# Patient Record
Sex: FEMALE | Race: WHITE
[De-identification: names, ages, dates, MRNs, and addresses within clinical notes are randomized per-mention and may not be internally consistent; named-entity substitution may affect disease eponyms.]

---

## 2017-04-01 ENCOUNTER — HOSPITAL ENCOUNTER (EMERGENCY)
Dept: HOSPITAL 41 - JD.ED | Age: 47
Discharge: HOME | End: 2017-04-01
Payer: COMMERCIAL

## 2017-04-01 VITALS — DIASTOLIC BLOOD PRESSURE: 72 MMHG | SYSTOLIC BLOOD PRESSURE: 111 MMHG

## 2017-04-01 DIAGNOSIS — G43.909: Primary | ICD-10-CM

## 2017-04-01 DIAGNOSIS — Z79.899: ICD-10-CM

## 2017-04-01 DIAGNOSIS — Z88.5: ICD-10-CM

## 2017-04-01 DIAGNOSIS — Z91.040: ICD-10-CM

## 2017-04-01 PROCEDURE — 70450 CT HEAD/BRAIN W/O DYE: CPT

## 2017-04-01 PROCEDURE — 96374 THER/PROPH/DIAG INJ IV PUSH: CPT

## 2017-04-01 PROCEDURE — 96361 HYDRATE IV INFUSION ADD-ON: CPT

## 2017-04-01 PROCEDURE — 96372 THER/PROPH/DIAG INJ SC/IM: CPT

## 2017-04-01 PROCEDURE — 96375 TX/PRO/DX INJ NEW DRUG ADDON: CPT

## 2017-04-01 PROCEDURE — 99284 EMERGENCY DEPT VISIT MOD MDM: CPT

## 2017-04-01 NOTE — CT
Addendum: Impression states areas of encephalomalacia on the right 

side as noted above.  Areas of encephalomalacia are on the left side. 

 Other portions of the dictation remain the same.

 

--- Addendum1 above dictated on [04/17/2017 11:06] by [BRANDO Mac, 

Keshawn SHRESTHA] ---

--- Addendum1 above signed on [04/17/2017 11:08] by [BRANDO Mac Hilton J.] ---

 

--- Original report below dictated on [04/01/2017 15:26] by [BRANDO Mac Hilton J.] ---

--- Original report below signed on [04/01/2017 15:26] by [BRANDO Mac Hilton J.] ---

 

Head CT

 

Technique: Multiple axial sections through the brain were obtained.  

Intravenous contrast was not utilized.

 

Comparison: No previous intracranial imaging.

 

Findings: Low density area is identified within the left posterior 

temporal and anterior occipital region compatible with an area of 

encephalomalacia from old injury.  Slight deformity in the adjacent 

cranium is seen compatible with old skull fracture.  No other abnormal

 parenchymal densities are seen.  No evidence of intracranial 

hemorrhage.  No midline shift or mass effect is seen.

 

Bone window settings were reviewed which shows mild mucosal thickening

 within portions of the sphenoid sinus and ethmoid sinuses.  No 

air-fluid levels are seen.  Soft tissue density is noted within the 

right mastoid sinus most likely chronic.  No acute calvarial 

abnormality is seen.

 

Impression:

1.  Mucosal thickening within the ethmoid and maxillary sinuses most 

likely representing chronic sinusitis.

2.  Soft tissue density within the right mastoid sinus most likely 

chronic.

3.  Area of encephalomalacia on the right side as noted above.  

Adjacent old appearing skull fracture noted.

4.  No acute intracranial abnormality is identified.

 

Diagnostic code #3

--- Addendum1 signed ---
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
## 2018-03-23 ENCOUNTER — HOSPITAL ENCOUNTER (OUTPATIENT)
Dept: HOSPITAL 41 - JD.SDS | Age: 48
Discharge: HOME | End: 2018-03-23
Attending: OBSTETRICS & GYNECOLOGY
Payer: COMMERCIAL

## 2018-03-23 VITALS — DIASTOLIC BLOOD PRESSURE: 81 MMHG | SYSTOLIC BLOOD PRESSURE: 122 MMHG

## 2018-03-23 DIAGNOSIS — K59.09: ICD-10-CM

## 2018-03-23 DIAGNOSIS — Z91.040: ICD-10-CM

## 2018-03-23 DIAGNOSIS — Z88.8: ICD-10-CM

## 2018-03-23 DIAGNOSIS — N70.01: ICD-10-CM

## 2018-03-23 DIAGNOSIS — F41.9: ICD-10-CM

## 2018-03-23 DIAGNOSIS — N70.11: ICD-10-CM

## 2018-03-23 DIAGNOSIS — H91.92: ICD-10-CM

## 2018-03-23 DIAGNOSIS — Z88.5: ICD-10-CM

## 2018-03-23 DIAGNOSIS — G43.909: ICD-10-CM

## 2018-03-23 DIAGNOSIS — D25.9: Primary | ICD-10-CM

## 2018-03-23 DIAGNOSIS — Z79.899: ICD-10-CM

## 2018-03-23 DIAGNOSIS — N80.0: ICD-10-CM

## 2018-03-23 DIAGNOSIS — N63.0: ICD-10-CM

## 2018-03-23 DIAGNOSIS — J30.2: ICD-10-CM

## 2018-03-23 PROCEDURE — 86901 BLOOD TYPING SEROLOGIC RH(D): CPT

## 2018-03-23 PROCEDURE — 81003 URINALYSIS AUTO W/O SCOPE: CPT

## 2018-03-23 PROCEDURE — 85025 COMPLETE CBC W/AUTO DIFF WBC: CPT

## 2018-03-23 PROCEDURE — 84703 CHORIONIC GONADOTROPIN ASSAY: CPT

## 2018-03-23 PROCEDURE — 86900 BLOOD TYPING SEROLOGIC ABO: CPT

## 2018-03-23 PROCEDURE — 82565 ASSAY OF CREATININE: CPT

## 2018-03-23 PROCEDURE — 58262 VAG HYST INCLUDING T/O: CPT

## 2018-03-23 PROCEDURE — 86850 RBC ANTIBODY SCREEN: CPT

## 2018-03-23 PROCEDURE — 36415 COLL VENOUS BLD VENIPUNCTURE: CPT

## 2018-03-23 RX ADMIN — LIDOCAINE HYDROCHLORIDE AND EPINEPHRINE ONE ML: 10; 10 INJECTION, SOLUTION INFILTRATION; PERINEURAL at 09:45

## 2018-03-23 RX ADMIN — SODIUM CHLORIDE ONE ML: 9 INJECTION, SOLUTION INTRAMUSCULAR; INTRAVENOUS; SUBCUTANEOUS at 10:56

## 2018-03-23 RX ADMIN — SODIUM CHLORIDE ONE ML: 9 INJECTION, SOLUTION INTRAMUSCULAR; INTRAVENOUS; SUBCUTANEOUS at 10:43

## 2018-03-23 RX ADMIN — LIDOCAINE HYDROCHLORIDE AND EPINEPHRINE ONE ML: 10; 10 INJECTION, SOLUTION INFILTRATION; PERINEURAL at 10:43
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
## 2018-03-23 NOTE — PCM.OPNOTE
- General Post-Op/Procedure Note


Date of Surgery/Procedure: 03/23/18


Operative Procedure(s): Total vaginal hysterectomy with bilateral salpingectomy


Findings: 





Uterus upper limits normal size. Ovaries were normal and functional in nature. 

Fallopian tubes were benign in appearance. Mild cystocele and rectocele noted.


Pre Op Diagnosis: Menorrhagia, irregular uterine bleeding


Post-Op Diagnosis: Same


Anesthesia Technique: General ET Tube


Other Anesthesia Type: Lidocaine quarter percent with [id number] mL


Primary Surgeon: Mario Padron


Secondary Surgeon: Tomas Coyne


Anesthesia Provider: Alban Mayorga


Assistant: Saji Pressley


Pathology: 





Uterus and bilateral fallopian tubes in one container


Fluid Replacement, Intraop: 1,700


EBL in mLs: 50


Complications: None


Condition: Good


Free Text/Narrative:: 





Surgery duration: 29 minutes








Procedure:   The patient was placed in supine position on the operating table. 

General endotracheal anesthesia was accomplished. After positioning, and 

adequate prep and drape, the procedure was then performed. Sterile speculum was 

placed in the vagina and cervix was visualized. Cervix was injected with 

lidocaine quarter percent with [id number] mL used. A full circumference 

incision was made in the cervical epithelium. The bladder was pushed well back 

off cervix. Posterior cul-de-sac was then entered sharply without problems. 

Left uterosacral was crossclamped with a Enseal vessel closure system. The left 

uterosacral and then the right uterosacral ligament pedicles were developed 

using the Enseal system. The anterior cul-de-sac was then entered without 

problems and the uterine vasculature, cardinal ligament and broad ligament then 

developed using Enseal vessel closure system. The uterus was inverted at this 

time and upper broad ligament fallopian tube pedicles were crossclamped with 

Mohan clamps. Specimen was totally removed. Both these pedicles were then 

secured with a Mohan stitch of #1 Vicryl.





Left and right fallopian tube was normal in appearance.. Using Enseal vessel 

closure system each of the tubes was then removed and sent with the specimen.  

  The patient was found to be hemostatically intact at this time.  Vaginal cuff 

was sutured for hemostatic reasons with a running locked suture of 0 Monocryl 

from the 2 o'clock position to the 10 o'clock position posteriorly.  Modified 

Moschcowitz procedure was performed with a n 0 Monocryl suture placed through 

the posterior aspect of the vaginal cuff over to the patient's right 

uterosacral ligament then the Small bites of posterior cul-de-sac peritoneum to 

the left uterosacral ligament and incorporating that and vaginal angle into 

this the suture and then back to midline and out the posterior vaginal cuff 

area. The suture was tied as the last action of this surgery. Vaginal cuff was 

then closed from right to left side with a running locked suture of 0 Monocryl.

  Patient was returned to supine position and awakened from general 

endotracheal anesthesia. She tolerated the procedure was then left the 

operating room in satisfactory condition.

## 2018-03-23 NOTE — [id number]
Post Anesthesia Note





- EVALUATION WITHIN 48HRS OF ANESTHETIC


Vital Signs in Normal Range: Yes


Patient Participated in Evaluation: Yes


Respiratory Function Stable: Yes


Airway Patent: Yes


Cardiovascular Function Stable: Yes


Hydration Status Stable: Yes


Pain Control Satisfactory: Yes


Nausea and Vomiting Control Satisfactory: Yes


Mental Status Recovered: Yes


Pulse Rate: 107


SaO2: 99


Resp Rate: 8


Temperature: 37.1 C


Blood Pressure: 116/63





- COMMENTS/OBSERVATIONS


Free Text/Narrative:: 





no anesthesia complications noted

## 2021-02-18 NOTE — CR
Left knee: AP and lateral views left knee were obtained.

 

Comparison: Prior CT left knee study of 01/28/21.

 

Findings: Left knee prosthesis is noted as well as a patellar 

prosthesis.  Components are aligned.  Soft tissue air is noted from 

the surgical procedure.  No acute underlying bony abnormality is 

otherwise seen.

 

Impression:

1.  Satisfactory postop radiographic appearance of recently placed 

left knee prosthesis.

 

Diagnostic code #2

## 2021-02-18 NOTE — [id number]
Post Anesthesia Note





- EVALUATION WITHIN 48HRS OF ANESTHETIC


Vital Signs in Normal Range: Yes


Patient Participated in Evaluation: Yes


Respiratory Function Stable: Yes


Airway Patent: Yes


Cardiovascular Function Stable: Yes


Hydration Status Stable: Yes


Pain Control Satisfactory: Yes


Nausea and Vomiting Control Satisfactory: Yes


Mental Status Recovered: Yes


Vital Signs: 


                                Last Vital Signs











Temp  97.9 F   02/18/21 12:00


 


Pulse  73   02/18/21 12:35


 


Resp  16   02/18/21 12:35


 


BP  111/71   02/18/21 12:35


 


Pulse Ox  99   02/18/21 12:35














- COMMENTS/OBSERVATIONS


Free Text/Narrative:: 


Preparing for discharge home

## 2021-02-18 NOTE — PCM.PRNOTE
- Free Text/Narrative


Note: 


Postoperative regional pain control requested by surgeon.


Pre-op Dx: Lt knee osteoarthritis.


Post-op Rx: Total Lt knee arthroplasty. 


Procedure: Lt Adductor canal block with U/S guidance


Requesting physician: Dr. Nilo Estrada





Risks and benefits discussed with the patient preoperatively including 

infection, bleeding, incomplete or failed block, possible nerve damage, local 

anesthetic toxicity. Permit signed.


Patient after spinal anesthesia post surgery in PACU, stable , alert and awake. 

Time out performed. Left mid-thigh was prepped with Chloraprep x 1 and allowed 

to dry. Under aseptic technique, the left femoral artery and sartorius muscle 

were identified under ultrasound prior to needle insertion. 4" Stimuplex needle 

#22 G was inserted under US guidance. Under direct visualization of needle tip 

the injection of 0.5% Ropivacaine with 1:200k epinephrine, with 8 mg of 

Dexamethasone and 40 mcg of Dexmedetomidine, total of 25 mls in divided doses, 

maintaining negative aspiration was completed without problems. No local 

anesthetic toxicity was noted. Patient is awake, stable and tolerated the 

procedure well.


Time: 10:05 - 10:08


Please see attached U/S pictures.

## 2021-02-18 NOTE — PCM.POSTAN
POST ANESTHESIA ASSESSMENT





- MENTAL STATUS


Mental Status: Alert, Oriented





- VITAL SIGNS


Vital Signs: 


Postoperative VSs





122/65


105


22 RR


99%


97.2F





- RESPIRATORY


Respiratory Status: Respiratory Rate WNL, Airway Patent, O2 Saturation Stable





- CARDIOVASCULAR


CV Status: Pulse Rate WNL, Blood Pressure Stable





- GASTROINTESTINAL


GI Status: No Symptoms





- PAIN


Pain Score: 3 (ACB block is being done)





- POST OP HYDRATION


Hydration Status: Adequate & Stable
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
## 2021-03-01 NOTE — PCM.OPNOTE
- General Post-Op/Procedure Note


Date of Surgery/Procedure: 02/18/21


Operative Procedure(s): left total knee arthroplasty with andra robotics


Pre Op Diagnosis: left knee osteoarthrosis


Post-Op Diagnosis: Same


Anesthesia Technique: Local, MAC, Spinal


Primary Surgeon: Nilo Suarez


Anesthesia Provider: Nicholas Patino


Assistant: Deborah Moreau


Assistant: Patricia Kelly


EBL in mLs: 100


Complications: None


Condition: Good


Free Text/Narrative:: 


3 femur


4itibia


9mm


32x10

## 2021-03-01 NOTE — OR
DATE OF OPERATION:  02/18/2021

 

SURGEON:  Nilo Suarez MD

 

OPERATION PERFORMED:  Left total knee arthroplasty with Ciro robotic.

 

PREOPERATIVE DIAGNOSIS:

Left knee osteoarthrosis.

 

POSTOPERATIVE DIAGNOSIS:

Left knee osteoarthrosis.

 

ANESTHESIA:

Local MAC with spinal.

 

ANESTHESIA PROVIDER:

Tia Irving.

 

ASSISTANTS:

Deborah Moreau PA-C and Patricia Kelly LPN

 

ESTIMATED FLUID LOSS:

100 mL.

 

COMPLICATIONS:

None.

 

CONDITION:

Stable.

 

IMPLANTS:

1. John size 3 press-fit CR femur.

2. John size 4 press-fit tibial baseplate.

3. [place] size 4, 9 mm CS polyethylene insert.

4. [place] size 32 x 10 mm press-fit asymmetric patella.

 

DESCRIPTION OF PROCEDURE:

The patient was identified in the preoperative holding area.  Proper site was

marked and identified by the surgeon.  The patient was taken back to the

operating theater where after adequate anesthesia, the patient had a nonsterile

tourniquet applied to left lower extremity.  Left lower extremity was then

sterilely prepped and draped in the usual sterile fashion.  OR time-out was

performed.  The patient received 2 g IV Ancef.  At this time, standard incision

was made anteriorly and a medial parapatellar arthrotomy was created.  At this

time, we noted the patient had no ACL whatsoever as well as some lateral

chondromalacial changes, so a partial total knee arthroplasty of the medial

compartment was unable to be done.  At this time, the anterior fat pad was

resected.  Deep fibers of the MCL were raised.  Attention was turned to the

patella.  Patella measured 24 and was resected to a 14 for a 32 x 10 mm press-

fit patella.  The drill holes were then drilled and found to be adequate.

Attention was turned to the femur.  Two 4-0 guide pins were placed in the femur

in the proper position and the Ciro John robotic array for the femur was

placed.  This was then done on the tibia, roughly 3 fingerbreadths below the

tibial tubercle.  Hip center of rotation was then obtained.  Medial and lateral

malleoli were marked.  40 points were then obtained from both the femur and

tibia at this point for the [place] Ciro robotic plan.  The plan was then done

for gaps of 19 mm in both extension and flexion for the patient, and the

anterior posterior and anterior and posterior chamfer cuts were then completed

as well as the tibial cut.  All cuts were then removed.  Medial and lateral

meniscus were removed.  Any posterior osteophytes were removed at this point.

The size 4 tibial trial was then placed as well as the size 3 trial femur.  A 9

mm trial poly was placed.  The patient's knee was brought to full extension and

was noted to have good gap balancing with no varus-valgus instability.  Full

extension was noted on the Ceres robotic assist as well as full flexion

with no signs of liftoff.  At this time, drill holes were drilled and the tibia

was stamped and drilled in proper rotation.  All trial implants were removed.

The size 4 press-fit tibia was impacted into place.  Size 3 press-fit femur was

impacted into place.  A 9 mm polyethylene was impacted into place.  The

patient's knee was brought into full extension and the 32 x 10 mm press-fit

patella was press-fit into place.  Tourniquet was deflated.  All bleeders were

cauterized.  400 mL of Irrisept irrigation were irrigated through the knee along

with 1 L pulse lavage irrigation with Ancef.  Periarticular injection was

completed as well as Marcaine injection subcutaneously.  Topical tranexamic acid

and vancomycin powder were placed intra-articularly.  #2 barbed suture was used

for closure of the medial parapatellar arthrotomy.  2-0 Vicryl as well as

Stratafix were used for subcutaneous closure and Prineo was used for skin

closure.  The patient had a sterile soft dressing applied and sent to PACU in

stable condition.

 

ESTIMATED BLOOD LOSS:

 

 

DD:  03/01/2021 17:41:27

DT:  03/01/2021 18:26:25  MMODAL

Job #:  [id number]